# Patient Record
(demographics unavailable — no encounter records)

---

## 2024-10-24 NOTE — DEVELOPMENTAL MILESTONES
[Laughs aloud] : laughs aloud [Turns to voice] : turns to voice [Vocalizes with extending cooing] : vocalizes with extending cooing [Rolls over prone to supine] : rolls over prone to supine [Supports on elbows & wrists in prone] : supports on elbows and wrists in prone [Keeps hands unfisted] : keeps hands unfisted [Plays with fingers in midline] : plays with fingers in midline [Grasps objects] : grasps objects [FreeTextEntry1] : rolling both ways, screeching, pushes up to stand,

## 2024-10-24 NOTE — HISTORY OF PRESENT ILLNESS
[Parents] : parents [Formula ___ oz/feed] : [unfilled] oz of formula per feed [Normal] : Normal [On back] : sleeps on back [Pacifier use] : Pacifier use [Tummy time] : tummy time [No] : No cigarette smoke exposure [Carbon Monoxide Detectors] : Carbon monoxide detectors at home [Smoke Detectors] : Smoke detectors at home. [NO] : No [Exposure to electronic nicotine delivery system] : No exposure to electronic nicotine delivery system [FreeTextEntry7] : 4mth old doing well, formula feeding,  [FreeTextEntry8] : once every 2 days,  [FreeTextEntry3] : in a crib

## 2024-10-24 NOTE — PHYSICAL EXAM
[Alert] : alert [Normocephalic] : normocephalic [Flat Open Anterior [place]] : flat open anterior fontanelle [Red Reflex] : red reflex bilateral [PERRL] : PERRL [Normally Placed Ears] : normally placed ears [Auricles Well Formed] : auricles well formed [Clear Tympanic membranes] : clear tympanic membranes [Light reflex present] : light reflex present [Bony landmarks visible] : bony landmarks visible [Nares Patent] : nares patent [Palate Intact] : palate intact [Uvula Midline] : uvula midline [Symmetric Chest Rise] : symmetric chest rise [Clear to Auscultation Bilaterally] : clear to auscultation bilaterally [Regular Rate and Rhythm] : regular rate and rhythm [S1, S2 present] : S1, S2 present [+2 Femoral Pulses] : (+) 2 femoral pulses [Soft] : soft [Bowel Sounds] : bowel sounds present [Central Urethral Opening] : central urethral opening [Testicles Descended] : testicles descended bilaterally [Patent] : patent [Normally Placed] : normally placed [No Abnormal Lymph Nodes Palpated] : no abnormal lymph nodes palpated [Startle Reflex] : startle reflex present [Plantar Grasp] : plantar grasp reflex present [Symmetric [place]] : symmetric scarlett [Acute Distress] : no acute distress [Discharge] : no discharge [Palpable Masses] : no palpable masses [Murmurs] : no murmurs [Tender] : nontender [Distended] : nondistended [Hepatomegaly] : no hepatomegaly [Splenomegaly] : no splenomegaly [Prado-Ortolani] : negative Prado-Ortolani [Allis Sign] : negative Allis sign [Spinal Dimple] : no spinal dimple [Tuft of Hair] : no tuft of hair [Rash or Lesions] : no rash/lesions [de-identified] : penile adhesions brought down

## 2024-11-18 NOTE — HISTORY OF PRESENT ILLNESS
[de-identified] : rash throughout body, mom has been using aquaphor and eucerin and better on face but body still with significant rash

## 2024-11-18 NOTE — PLAN
[TextEntry] : moisturize with vaseline/aquaphor and cerave,   hc2.5% bid to persistent dry patches, call if no improvement

## 2024-12-26 NOTE — PLAN
[TextEntry] : [id number] for flu 2 introduce allergenic foods, d/c dad some infants take longer to eat food, go at his pace rto 3mth for well exam

## 2024-12-26 NOTE — HISTORY OF PRESENT ILLNESS
[Father] : father [Formula ___ oz/feed] : [unfilled] oz of formula per feed [Normal] : Normal [In Bassinet/Crib] : sleeps in bassinet/crib [On back] : sleeps on back [Pacifier use] : Pacifier use [Tummy time] : tummy time [No] : No cigarette smoke exposure [Carbon Monoxide Detectors] : Carbon monoxide detectors at home [Smoke Detectors] : Smoke detectors at home. [NO] : No [Exposure to electronic nicotine delivery system] : No exposure to electronic nicotine delivery system [de-identified] : 6mth old doing well.  tried feeding but does not eat much

## 2024-12-26 NOTE — DEVELOPMENTAL MILESTONES
[Pats or smiles at reflection] : pats or smiles at reflection [Begins to turn when name called] : begins to turn when name called [Babbles] : babbles [Rolls over prone to supine] : rolls over prone to supine [Sits briefly without support] : sits briefly without support [Reaches for object and transfers] : reaches for object and transfers [Rakes small object with 4 fingers] : rakes small object with 4 fingers [[place] small object on surface] : bangs small object on surface

## 2024-12-26 NOTE — PHYSICAL EXAM
[Alert] : alert [Normocephalic] : normocephalic [Flat Open Anterior [place]] : flat open anterior fontanelle [Red Reflex] : red reflex bilateral [PERRL] : PERRL [Normally Placed Ears] : normally placed ears [Auricles Well Formed] : auricles well formed [Clear Tympanic membranes] : clear tympanic membranes [Light reflex present] : light reflex present [Bony landmarks visible] : bony landmarks visible [Nares Patent] : nares patent [Palate Intact] : palate intact [Uvula Midline] : uvula midline [Supple, full passive range of motion] : supple, full passive range of motion [Symmetric Chest Rise] : symmetric chest rise [Clear to Auscultation Bilaterally] : clear to auscultation bilaterally [Regular Rate and Rhythm] : regular rate and rhythm [S1, S2 present] : S1, S2 present [+2 Femoral Pulses] : (+) 2 femoral pulses [Soft] : soft [Bowel Sounds] : bowel sounds present [Central Urethral Opening] : central urethral opening [Testicles Descended] : testicles descended bilaterally [Patent] : patent [Normally Placed] : normally placed [No Abnormal Lymph Nodes Palpated] : no abnormal lymph nodes palpated [Symmetric Buttocks Creases] : symmetric buttocks creases [Plantar Grasp] : plantar grasp reflex present [Cranial Nerves Grossly Intact] : cranial nerves grossly intact [Normal External Genitalia] : normal external genitalia [Circumcised] : circumcised [Acute Distress] : no acute distress [Discharge] : no discharge [Tooth Eruption] : no tooth eruption [Palpable Masses] : no palpable masses [Murmurs] : no murmurs [Tender] : nontender [Distended] : nondistended [Hepatomegaly] : no hepatomegaly [Splenomegaly] : no splenomegaly [Prado-Ortolani] : negative Prado-Ortolani [Allis Sign] : negative Allis sign [Spinal Dimple] : no spinal dimple [Tuft of Hair] : no tuft of hair [Rash or Lesions] : no rash/lesions [de-identified] : mild adhesions

## 2025-02-28 NOTE — PHYSICAL EXAM
[NL] : no abnormal lymph nodes palpated Detail Level: Zone [de-identified] : dry skin with pink patches on trunk  Detail Level: Simple

## 2025-03-03 NOTE — DISCUSSION/SUMMARY
[FreeTextEntry1] : catherine AOM lungs clear teething symptoms also  will start treatment for AOM, recommended use of GI probiotics to help with possible GI side effects gave flu booster and started COVID series, will f/u in 2 wks to recheck ears then 1 month (after 4/3 for 9 month visit so covid booster is eligible too)

## 2025-03-03 NOTE — HISTORY OF PRESENT ILLNESS
[EENT/Resp Symptoms] : EENT/RESPIRATORY SYMPTOMS [GI Symptoms] : GI SYMPTOMS [FreeTextEntry6] : spoke to mom over the weekend due to concern regarding his vomiting episodes stuffy nose and cough but no fever still and vomiting has lessened, seemed bit cranky but not

## 2025-03-03 NOTE — PHYSICAL EXAM
[Clear] : right tympanic membrane not clear [Erythema] : no erythema [Bulging] : not bulging [Purulent Effusion] : purulent effusion [Clear Effusion] : no clear effusion [NL] : warm, clear [de-identified] : flushed cheeks

## 2025-03-05 NOTE — DISCUSSION/SUMMARY
[FreeTextEntry1] : # AOM - continue and complete antibiotic # new onset diaper dermatitis - zinc oxide application - suggested probiotics and pain control - will follow

## 2025-03-05 NOTE — PHYSICAL EXAM
[Purulent Effusion] : purulent effusion [Clear Rhinorrhea] : clear rhinorrhea [NL] : normotonic [Maculopapular Eruption] : maculopapular eruption [Buttocks] : buttocks [de-identified] : lower abdominal wall

## 2025-03-05 NOTE — HISTORY OF PRESENT ILLNESS
[de-identified] : irritable and crying [FreeTextEntry6] : - seen by us 2 days prior, diagnosed AOM, received vaccines (Flu, COVID) - since last night, developed diarrhea, irritable and crying  - a/w moist cough

## 2025-03-17 NOTE — HISTORY OF PRESENT ILLNESS
[FreeTextEntry6] : f/u ear infection, no fever, last few days was a bit fussy during the day but consolable and slept well

## 2025-04-03 NOTE — DEVELOPMENTAL MILESTONES
[Uses basic gestures] : uses basic gestures [Says "Franc" or "Mama"] : says "Franc" or "Mama" nonspecifically [Sits well without support] : sits well without support [Transitions between sitting and lying] : transitions between sitting and lying [Balances on hands and knees] : balances on hands and knees [Crawls] : crawls [Picks up small objects with 3 fingers] : picks up small objects with 3 fingers and thumb [Releases objects intentionally] : releases objects intentionally [[place] objects together] : bangs objects together

## 2025-04-03 NOTE — HISTORY OF PRESENT ILLNESS
[Mother] : mother [Normal] : Normal [In Crib] : sleeps in crib [No] : No exposure to electronic nicotine device [Yes] : At  exposure [Carbon Monoxide Detectors] : Carbon monoxide detectors [Smoke Detectors] : Smoke detectors [NO] : No [FreeTextEntry7] : 9 mth old doing well, had a double ear infection, was sick with covid vaccine, had been sick but got much worse for a few days but now he is much better [de-identified] : no allergies to any food, did most allergenic foods except for shellfish and sesame,  [de-identified] : eating well, formula fed,  [de-identified] : for sleep,

## 2025-04-03 NOTE — DEVELOPMENTAL MILESTONES
[Uses basic gestures] : uses basic gestures [Says "Franc" or "Mama"] : says "Franc" or "Mama" nonspecifically [Sits well without support] : sits well without support [Transitions between sitting and lying] : transitions between sitting and lying [Balances on hands and knees] : balances on hands and knees [Crawls] : crawls [Picks up small objects with 3 fingers] : picks up small objects with 3 fingers and thumb [Releases objects intentionally] : releases objects intentionally [[place] objects together] : bangs objects together

## 2025-04-03 NOTE — PHYSICAL EXAM
[Alert] : alert [Normocephalic] : normocephalic [Flat Open Anterior [place]] : flat open anterior fontanelle [Red Reflex] : red reflex bilateral [PERRL] : PERRL [Normally Placed Ears] : normally placed ears [Auricles Well Formed] : auricles well formed [Clear Tympanic membranes] : clear tympanic membranes [Light reflex present] : light reflex present [Bony landmarks visible] : bony landmarks visible [Nares Patent] : nares patent [Palate Intact] : palate intact [Uvula Midline] : uvula midline [Supple, full passive range of motion] : supple, full passive range of motion [Symmetric Chest Rise] : symmetric chest rise [Clear to Auscultation Bilaterally] : clear to auscultation bilaterally [Regular Rate and Rhythm] : regular rate and rhythm [S1, S2 present] : S1, S2 present [+2 Femoral Pulses] : (+) 2 femoral pulses [Soft] : soft [Bowel Sounds] : bowel sounds present [Central Urethral Opening] : central urethral opening [Testicles Descended] : testicles descended bilaterally [No Abnormal Lymph Nodes Palpated] : no abnormal lymph nodes palpated [Symmetric Abduction and Rotation of hips] : symmetric abduction and rotation of hips [Straight] : straight [Cranial Nerves Grossly Intact] : cranial nerves grossly intact [Acute Distress] : no acute distress [Excessive Tearing] : no excessive tearing [Discharge] : no discharge [Palpable Masses] : no palpable masses [Murmurs] : no murmurs [Tender] : nontender [Distended] : nondistended [Hepatomegaly] : no hepatomegaly [Splenomegaly] : no splenomegaly [Allis Sign] : negative Allis sign [Rash or Lesions] : no rash/lesions [de-identified] : 2 lower and 4 upper incisors

## 2025-04-03 NOTE — HISTORY OF PRESENT ILLNESS
[Mother] : mother [Normal] : Normal [In Crib] : sleeps in crib [No] : No exposure to electronic nicotine device [Yes] : At  exposure [Carbon Monoxide Detectors] : Carbon monoxide detectors [Smoke Detectors] : Smoke detectors [NO] : No [FreeTextEntry7] : 9 mth old doing well, had a double ear infection, was sick with covid vaccine, had been sick but got much worse for a few days but now he is much better [de-identified] : no allergies to any food, did most allergenic foods except for shellfish and sesame,  [de-identified] : eating well, formula fed,  [de-identified] : for sleep,

## 2025-04-03 NOTE — PHYSICAL EXAM
[Alert] : alert [Normocephalic] : normocephalic [Flat Open Anterior [place]] : flat open anterior fontanelle [Red Reflex] : red reflex bilateral [PERRL] : PERRL [Normally Placed Ears] : normally placed ears [Auricles Well Formed] : auricles well formed [Clear Tympanic membranes] : clear tympanic membranes [Light reflex present] : light reflex present [Bony landmarks visible] : bony landmarks visible [Nares Patent] : nares patent [Palate Intact] : palate intact [Uvula Midline] : uvula midline [Supple, full passive range of motion] : supple, full passive range of motion [Symmetric Chest Rise] : symmetric chest rise [Clear to Auscultation Bilaterally] : clear to auscultation bilaterally [Regular Rate and Rhythm] : regular rate and rhythm [S1, S2 present] : S1, S2 present [+2 Femoral Pulses] : (+) 2 femoral pulses [Soft] : soft [Bowel Sounds] : bowel sounds present [Central Urethral Opening] : central urethral opening [Testicles Descended] : testicles descended bilaterally [No Abnormal Lymph Nodes Palpated] : no abnormal lymph nodes palpated [Symmetric Abduction and Rotation of hips] : symmetric abduction and rotation of hips [Straight] : straight [Cranial Nerves Grossly Intact] : cranial nerves grossly intact [Acute Distress] : no acute distress [Excessive Tearing] : no excessive tearing [Discharge] : no discharge [Palpable Masses] : no palpable masses [Murmurs] : no murmurs [Tender] : nontender [Distended] : nondistended [Hepatomegaly] : no hepatomegaly [Splenomegaly] : no splenomegaly [Allis Sign] : negative Allis sign [Rash or Lesions] : no rash/lesions [de-identified] : 2 lower and 4 upper incisors

## 2025-04-17 NOTE — HISTORY OF PRESENT ILLNESS
[de-identified] : has been sneezing, congested, no cough, cranky intermittent for last 2 days, but also has been constipated since sunday, since sunday has had bms but small amounts, now pasty, but seems to strain,

## 2025-05-05 NOTE — PLAN
[TextEntry] : if fever >5days, if new sxs, if change in behavior (irritable, lethargy or any changes that are concerning) call for f/u

## 2025-05-05 NOTE — HISTORY OF PRESENT ILLNESS
[de-identified] : last night before bed was shivering but no fever, woke up but then went back to sleep, this morning 102, no other sxs, other than a bit of a cough, ate well this morning, no vomiting, no diarrhea, no one sick at home, eating and drinking well, slept well last night except woke up once, playful as usual today

## 2025-06-29 NOTE — HISTORY OF PRESENT ILLNESS
[Mother] : mother [Formula ___ oz/feed] : [unfilled] oz of formula per feed [Normal] : Normal [Brushing teeth] : Brushing teeth [Toothpaste] : Primary Fluoride Source: Toothpaste [Smoke Detectors] : Smoke detectors [Carbon Monoxide Detectors] : Carbon monoxide detectors [NO] : No [No] : Patient does not go to dentist yearly [Exposure to electronic nicotine delivery system] : No exposure to electronic nicotine delivery system [FreeTextEntry7] : 2 yo recovering from coxsackie, doing well, no concerns [de-identified] : will start milk once returns from travel, eats well

## 2025-06-29 NOTE — PHYSICAL EXAM
[Alert] : alert [Normocephalic] : normocephalic [Closed Anterior [place]] : closed anterior fontanelle [Red Reflex] : red reflex bilateral [PERRL] : PERRL [Normally Placed Ears] : normally placed ears [Auricles Well Formed] : auricles well formed [Clear Tympanic membranes] : clear tympanic membranes [Light reflex present] : light reflex present [Bony landmarks visible] : bony landmarks visible [Nares Patent] : nares patent [Palate Intact] : palate intact [Uvula Midline] : uvula midline [Tooth Eruption] : tooth eruption [Supple, full passive range of motion] : supple, full passive range of motion [Symmetric Chest Rise] : symmetric chest rise [Clear to Auscultation Bilaterally] : clear to auscultation bilaterally [Regular Rate and Rhythm] : regular rate and rhythm [S1, S2 present] : S1, S2 present [+2 Femoral Pulses] : (+) 2 femoral pulses [Soft] : soft [Bowel Sounds] : normoactive bowel sounds [Central Urethral Opening] : central urethral opening [Testicles Descended] : testicles descended bilaterally [No Abnormal Lymph Nodes Palpated] : no abnormal lymph nodes palpated [Symmetric Abduction and Rotation of Hips] : symmetric abduction and rotation of hips [Straight] : straight [Cranial Nerves Grossly Intact] : cranial nerves grossly intact [Discharge] : no discharge [Palpable Masses] : no palpable masses [Murmurs] : no murmurs [Tender] : nontender [Distended] : nondistended [Hepatomegaly] : no hepatomegaly [Splenomegaly] : no splenomegaly [Allis Sign] : negative Allis sign [Rash or Lesions] : no rash/lesions [de-identified] : 4 upper and 4 lower incisors,

## 2025-06-29 NOTE — DEVELOPMENTAL MILESTONES
[Looks for hidden objects] : looks for hidden objects [Imitates new gestures] : imitates new gestures [Says "Dad" or "Mom" with meaning] : says "Dad" or "Mom" with meaning [Uses one word other than Mom or] : uses one word other than Mom or Dad or personal names [Follows a verbal command that] : follows a verbal command that includes a gesture [Drops object in a cup] : drops object in a cup [Picks up small object with 2 finger] : picks up small object with 2 finger pincer grasp [Picks up food and eats it] : picks up food and eats it [Takes first independent] : does not take first independent steps [FreeTextEntry1] : byby, young responds to his name cruises, will let go for a second

## 2025-07-17 NOTE — PLAN
[TextEntry] : d/c mom temp of 99 and cranky prob related to teething but if fever 101or > more likely viral and to f/u in office